# Patient Record
Sex: MALE | HISPANIC OR LATINO
[De-identification: names, ages, dates, MRNs, and addresses within clinical notes are randomized per-mention and may not be internally consistent; named-entity substitution may affect disease eponyms.]

---

## 2024-06-17 PROBLEM — Z00.00 ENCOUNTER FOR PREVENTIVE HEALTH EXAMINATION: Status: ACTIVE | Noted: 2024-06-17

## 2024-06-24 ENCOUNTER — APPOINTMENT (OUTPATIENT)
Dept: PULMONOLOGY | Facility: CLINIC | Age: 54
End: 2024-06-24
Payer: COMMERCIAL

## 2024-06-24 VITALS
BODY MASS INDEX: 30.53 KG/M2 | SYSTOLIC BLOOD PRESSURE: 148 MMHG | OXYGEN SATURATION: 97 % | DIASTOLIC BLOOD PRESSURE: 96 MMHG | HEART RATE: 95 BPM | HEIGHT: 66 IN | TEMPERATURE: 97.8 F | WEIGHT: 190 LBS

## 2024-06-24 DIAGNOSIS — Z87.891 PERSONAL HISTORY OF NICOTINE DEPENDENCE: ICD-10-CM

## 2024-06-24 DIAGNOSIS — R06.83 SNORING: ICD-10-CM

## 2024-06-24 DIAGNOSIS — R05.9 COUGH, UNSPECIFIED: ICD-10-CM

## 2024-06-24 DIAGNOSIS — Z82.49 FAMILY HISTORY OF ISCHEMIC HEART DISEASE AND OTHER DISEASES OF THE CIRCULATORY SYSTEM: ICD-10-CM

## 2024-06-24 DIAGNOSIS — I10 ESSENTIAL (PRIMARY) HYPERTENSION: ICD-10-CM

## 2024-06-24 DIAGNOSIS — K21.9 GASTRO-ESOPHAGEAL REFLUX DISEASE W/OUT ESOPHAGITIS: ICD-10-CM

## 2024-06-24 DIAGNOSIS — Z92.89 PERSONAL HISTORY OF OTHER MEDICAL TREATMENT: ICD-10-CM

## 2024-06-24 DIAGNOSIS — Z86.2 PERSONAL HISTORY OF DISEASES OF THE BLOOD AND BLOOD-FORMING ORGANS AND CERTAIN DISORDERS INVOLVING THE IMMUNE MECHANISM: ICD-10-CM

## 2024-06-24 LAB — HEMOGLOBIN: 13.9

## 2024-06-24 PROCEDURE — 94729 DIFFUSING CAPACITY: CPT

## 2024-06-24 PROCEDURE — ZZZZZ: CPT

## 2024-06-24 PROCEDURE — 94010 BREATHING CAPACITY TEST: CPT

## 2024-06-24 PROCEDURE — 71046 X-RAY EXAM CHEST 2 VIEWS: CPT

## 2024-06-24 PROCEDURE — 99204 OFFICE O/P NEW MOD 45 MIN: CPT | Mod: 25

## 2024-06-24 PROCEDURE — 85018 HEMOGLOBIN: CPT | Mod: QW

## 2024-06-24 PROCEDURE — 95012 NITRIC OXIDE EXP GAS DETER: CPT

## 2024-06-24 PROCEDURE — 94727 GAS DIL/WSHOT DETER LNG VOL: CPT

## 2024-07-03 ENCOUNTER — APPOINTMENT (OUTPATIENT)
Dept: PULMONOLOGY | Facility: CLINIC | Age: 54
End: 2024-07-03

## 2024-07-08 ENCOUNTER — APPOINTMENT (OUTPATIENT)
Dept: PULMONOLOGY | Facility: CLINIC | Age: 54
End: 2024-07-08

## 2024-08-09 ENCOUNTER — APPOINTMENT (OUTPATIENT)
Dept: ORTHOPEDIC SURGERY | Facility: CLINIC | Age: 54
End: 2024-08-09

## 2024-08-09 PROBLEM — Z86.79 HISTORY OF HYPERTENSION: Status: RESOLVED | Noted: 2024-06-24 | Resolved: 2024-08-09

## 2024-08-09 PROBLEM — M23.90 INTERNAL DERANGEMENT OF KNEE, UNSPECIFIED LATERALITY: Status: ACTIVE | Noted: 2024-08-09

## 2024-08-09 PROBLEM — Z86.2 HISTORY OF ANEMIA: Status: RESOLVED | Noted: 2024-06-24 | Resolved: 2024-08-09

## 2024-08-09 PROBLEM — M17.12 ARTHRITIS OF KNEE, LEFT: Status: ACTIVE | Noted: 2024-08-09

## 2024-08-09 PROBLEM — M17.11 ARTHRITIS OF KNEE, RIGHT: Status: ACTIVE | Noted: 2024-08-09

## 2024-08-09 PROCEDURE — 99203 OFFICE O/P NEW LOW 30 MIN: CPT | Mod: 25

## 2024-08-09 PROCEDURE — 73564 X-RAY EXAM KNEE 4 OR MORE: CPT | Mod: 50

## 2024-08-09 PROCEDURE — 20610 DRAIN/INJ JOINT/BURSA W/O US: CPT | Mod: 50

## 2024-08-09 NOTE — IMAGING
[Bilateral] : knee bilaterally [Loose body] : Loose body [Moderate tricompartmental OA medial narrowing] : Moderate tricompartmental OA medial narrowing [de-identified] : Constitutional: The patient appears well developed, well nourished. Skin: No impressive skin lesions present, except as noted in detailed exam. Lymphatic: No palpable lymphadenopathy in examined body areas. Neurologic: Alert and oriented to time, place and person.   BILATERAL KNEE:  Inspection: mild effusion Palpation: medial joint line tenderness, anterior tenderness Knee Range of Motion: 3-125 Strength: 5/5 Quadriceps strength, 5/5 Hamstring strength Neurological: light touch is intact throughout Ligament Stability and Special Tests: McMurrays: neg Lachman: neg Pivot Shift: neg Posterior Drawer: neg Valgus: neg Varus: neg Patella Apprehension: neg Patella Maltracking: neg

## 2024-08-09 NOTE — HISTORY OF PRESENT ILLNESS
[de-identified] : 8/9/24: B/L Knee pain increased following stress test about a 1.5 months ago. NKI to the knees. R>L at this time. Patient has discomfort and sharp pain with twisting, standing for longer periods, stairs as well. Wearing KT tape   occ: Construction

## 2024-08-09 NOTE — PROCEDURE
[FreeTextEntry3] : Patient Identification  Name/: Verbal with patient and/or family    Procedure Verification:  Procedure confirmed with patient or family/designee  Consent for procedure: Verbal Consent Given  Relevant documentation completed, reviewed, and signed  Clinical indications for procedure confirmed    Time-out with all members of procedure team immediately prior to procedure:  Correct patient identified. Agreement on procedure. Correct side and site.    KNEE INJECTION (STEROID) - B/L  After verbal consent and identification of the correct patient and correct site, the B/L superolateral knees were prepped using alcohol swabs and betadine. This was allowed time to air dry. A mixture of 1cc Celestone 6mg/ml, 2cc Lidocaine 1%, and 2cc Bupivacaine 0.5% was injected into the suprapatellar pouch using a sterile 22G needle with US after ethyl chloride spray for skin anesthesia. The patient tolerated the procedure well. After-care instructions were provided and included instructions to ice the area and to call if redness, pain, or fever develop.

## 2024-08-09 NOTE — DISCUSSION/SUMMARY
[de-identified] : The natural progression of osteoarthritis was explained to the patient.  We discussed the possible treatment options from conservative to operative.  These included NSAIDS, Glucosamine and Chondrotin sulfate, and Physical Therapy as well different types of injections.  We also discussed that at some point they may progress to needed a TKA.  Information and pamphlets were given.  We discussed their diagnosis and treatment options at length including surgical and non-surgical options. We will first attempt conservative treatment with activity modification, PT, icing, weight loss, and anti-inflammatory medications. We discussed the possible of injections (steroid and viscosupplementation) in the future. The patient was provided with a PT prescription to work on ROM, hip ER/abductors strengthening, quad/hamstring stretches and strengthening, and other exercises on the Knee Arthritis Protocol.  Dx / Natural History: The patient was advised of the diagnosis.  The natural history of the pathology was explained in full to the patient in layman's terms.  Several different treatment options were discussed and explained in full to the patient including the risks and benefits of both surgical and non-surgical treatments.  All questions and concerns were answered.   Pain Guide Activities: The patient was advised to let pain guide the gradual advancement of activities.  Icing: The patient was advised to apply ice (wrapped in a towel or protective covering) to the area daily (20 minutes at a time, 2-4X/day).  Discussed csi vs HA inj. Patient would like csi in B/L knees today.  Will request authorization for Euflexxa  Follow up after auth

## 2024-09-06 ENCOUNTER — APPOINTMENT (OUTPATIENT)
Dept: ORTHOPEDIC SURGERY | Facility: CLINIC | Age: 54
End: 2024-09-06

## 2024-09-06 VITALS — HEIGHT: 66 IN | WEIGHT: 179 LBS | BODY MASS INDEX: 28.77 KG/M2

## 2024-09-06 PROCEDURE — 99212 OFFICE O/P EST SF 10 MIN: CPT | Mod: 25

## 2024-09-06 PROCEDURE — 20611 DRAIN/INJ JOINT/BURSA W/US: CPT | Mod: 50

## 2024-09-06 PROCEDURE — 20610 DRAIN/INJ JOINT/BURSA W/O US: CPT | Mod: 50

## 2024-09-06 NOTE — DISCUSSION/SUMMARY
[de-identified] : The natural progression of osteoarthritis was explained to the patient.  We discussed the possible treatment options from conservative to operative.  These included NSAIDS, Glucosamine and Chondrotin sulfate, and Physical Therapy as well different types of injections.  We also discussed that at some point they may progress to needed a TKA.  Information and pamphlets were given.  We discussed their diagnosis and treatment options at length including surgical and non-surgical options. We will first attempt conservative treatment with activity modification, PT, icing, weight loss, and anti-inflammatory medications. We discussed the possible of injections (steroid and viscosupplementation) in the future. The patient was provided with a PT prescription to work on ROM, hip ER/abductors strengthening, quad/hamstring stretches and strengthening, and other exercises on the Knee Arthritis Protocol.  Dx / Natural History: The patient was advised of the diagnosis.  The natural history of the pathology was explained in full to the patient in layman's terms.  Several different treatment options were discussed and explained in full to the patient including the risks and benefits of both surgical and non-surgical treatments.  All questions and concerns were answered.   Pain Guide Activities: The patient was advised to let pain guide the gradual advancement of activities.  Icing: The patient was advised to apply ice (wrapped in a towel or protective covering) to the area daily (20 minutes at a time, 2-4X/day).  Euflexxa inj #1 in B/L knees today - yecenia well  Follow up 1 week to continue series

## 2024-09-06 NOTE — IMAGING
[de-identified] : Constitutional: The patient appears well developed, well nourished. Skin: No impressive skin lesions present, except as noted in detailed exam. Lymphatic: No palpable lymphadenopathy in examined body areas. Neurologic: Alert and oriented to time, place and person.   BILATERAL KNEE:  Inspection: mild effusion Palpation: medial joint line tenderness, anterior tenderness Knee Range of Motion: 3-125 Strength: 5/5 Quadriceps strength, 5/5 Hamstring strength Neurological: light touch is intact throughout Ligament Stability and Special Tests: McMurrays: neg Lachman: neg Pivot Shift: neg Posterior Drawer: neg Valgus: neg Varus: neg Patella Apprehension: neg Patella Maltracking: neg

## 2024-09-06 NOTE — HISTORY OF PRESENT ILLNESS
[de-identified] : 9/6/24: Here for follow up of B/L knees. Received CSI at last appointment, states provided 1 week relief. Pt c/o persistent pain in b/l knees and would like to proceed w/ Euflexxa injections #1 in both knees.   8/9/24: B/L Knee pain increased following stress test about a 1.5 months ago. NKI to the knees. R>L at this time. Patient has discomfort and sharp pain with twisting, standing for longer periods, stairs as well. Wearing KT tape   occ: Construction

## 2024-09-06 NOTE — HISTORY OF PRESENT ILLNESS
[de-identified] : 9/6/24: Here for follow up of B/L knees. Received CSI at last appointment, states provided 1 week relief. Pt c/o persistent pain in b/l knees and would like to proceed w/ Euflexxa injections #1 in both knees.   8/9/24: B/L Knee pain increased following stress test about a 1.5 months ago. NKI to the knees. R>L at this time. Patient has discomfort and sharp pain with twisting, standing for longer periods, stairs as well. Wearing KT tape   occ: Construction

## 2024-09-06 NOTE — IMAGING
[de-identified] : Constitutional: The patient appears well developed, well nourished. Skin: No impressive skin lesions present, except as noted in detailed exam. Lymphatic: No palpable lymphadenopathy in examined body areas. Neurologic: Alert and oriented to time, place and person.   BILATERAL KNEE:  Inspection: mild effusion Palpation: medial joint line tenderness, anterior tenderness Knee Range of Motion: 3-125 Strength: 5/5 Quadriceps strength, 5/5 Hamstring strength Neurological: light touch is intact throughout Ligament Stability and Special Tests: McMurrays: neg Lachman: neg Pivot Shift: neg Posterior Drawer: neg Valgus: neg Varus: neg Patella Apprehension: neg Patella Maltracking: neg

## 2024-09-06 NOTE — PROCEDURE
[FreeTextEntry3] : The risks, benefits, and alternatives to viscosupplementation injection were reviewed with the patient. Risks outlined include but are not limited to infection, sepsis, bleeding, scarring, temporary increase in pain, syncopal episode, failure to resolve symptoms, symptoms recurrence, allergic reaction, flare reaction, pseudoseptic reaction.  Patient understood the risks and asked to proceed with this treatment course.  Large joint injection was performed of the BILATERAL knees. The indication for this procedure was pain, inflammation and x-ray evidence of Osteoarthritis on this or prior visit. The site was prepped with alcohol, betadine, ethyl chloride sprayed topically and sterile technique used. An injection of Euflexxa, series # 1 was used.  Patient tolerated procedure well. Patient was advised to call if redness, pain or fever occur and apply ice for 15 minutes out of every hour for the next 12-24 hours as tolerated.  Patient has tried OTC's including aspirin, Ibuprofen, Aleve, etc or prescription NSAIDS, and/or exercises at home and/or physical therapy without satisfactory response, patient had decreased mobility in the joint and the risks benefits, and alternatives have been discussed, and verbal consent was obtained.

## 2024-09-06 NOTE — DISCUSSION/SUMMARY
[de-identified] : The natural progression of osteoarthritis was explained to the patient.  We discussed the possible treatment options from conservative to operative.  These included NSAIDS, Glucosamine and Chondrotin sulfate, and Physical Therapy as well different types of injections.  We also discussed that at some point they may progress to needed a TKA.  Information and pamphlets were given.  We discussed their diagnosis and treatment options at length including surgical and non-surgical options. We will first attempt conservative treatment with activity modification, PT, icing, weight loss, and anti-inflammatory medications. We discussed the possible of injections (steroid and viscosupplementation) in the future. The patient was provided with a PT prescription to work on ROM, hip ER/abductors strengthening, quad/hamstring stretches and strengthening, and other exercises on the Knee Arthritis Protocol.  Dx / Natural History: The patient was advised of the diagnosis.  The natural history of the pathology was explained in full to the patient in layman's terms.  Several different treatment options were discussed and explained in full to the patient including the risks and benefits of both surgical and non-surgical treatments.  All questions and concerns were answered.   Pain Guide Activities: The patient was advised to let pain guide the gradual advancement of activities.  Icing: The patient was advised to apply ice (wrapped in a towel or protective covering) to the area daily (20 minutes at a time, 2-4X/day).  Euflexxa inj #1 in B/L knees today - yecenia well  Follow up 1 week to continue series

## 2024-09-13 ENCOUNTER — APPOINTMENT (OUTPATIENT)
Dept: ORTHOPEDIC SURGERY | Facility: CLINIC | Age: 54
End: 2024-09-13

## 2024-09-13 VITALS — WEIGHT: 179 LBS | BODY MASS INDEX: 28.77 KG/M2 | HEIGHT: 66 IN

## 2024-09-13 PROCEDURE — 20610 DRAIN/INJ JOINT/BURSA W/O US: CPT | Mod: 50

## 2024-09-13 PROCEDURE — 99213 OFFICE O/P EST LOW 20 MIN: CPT | Mod: 25

## 2024-09-13 NOTE — PROCEDURE
[FreeTextEntry3] : The risks, benefits, and alternatives to viscosupplementation injection were reviewed with the patient. Risks outlined include but are not limited to infection, sepsis, bleeding, scarring, temporary increase in pain, syncopal episode, failure to resolve symptoms, symptoms recurrence, allergic reaction, flare reaction, pseudoseptic reaction.  Patient understood the risks and asked to proceed with this treatment course.  Large joint injection was performed of the BILATERAL knees. The indication for this procedure was pain, inflammation and x-ray evidence of Osteoarthritis on this or prior visit. The site was prepped with alcohol, betadine, ethyl chloride sprayed topically and sterile technique used. An injection of Euflexxa, series # 2 was used.  Patient tolerated procedure well. Patient was advised to call if redness, pain or fever occur and apply ice for 15 minutes out of every hour for the next 12-24 hours as tolerated.  Patient has tried OTC's including aspirin, Ibuprofen, Aleve, etc or prescription NSAIDS, and/or exercises at home and/or physical therapy without satisfactory response, patient had decreased mobility in the joint and the risks benefits, and alternatives have been discussed, and verbal consent was obtained.

## 2024-09-13 NOTE — IMAGING
[de-identified] : Constitutional: The patient appears well developed, well nourished. Skin: No impressive skin lesions present, except as noted in detailed exam. Lymphatic: No palpable lymphadenopathy in examined body areas. Neurologic: Alert and oriented to time, place and person.   BILATERAL KNEES:  Inspection: mild effusion Palpation: medial joint line tenderness, anterior tenderness Knee Range of Motion: 3-125 Strength: 5/5 Quadriceps strength, 5/5 Hamstring strength Neurological: light touch is intact throughout Ligament Stability and Special Tests: McMurrays: neg Lachman: neg Pivot Shift: neg Posterior Drawer: neg Valgus: neg Varus: neg Patella Apprehension: neg Patella Maltracking: neg

## 2024-09-13 NOTE — DISCUSSION/SUMMARY
[de-identified] : Assessment:   The patient is a 54 year old male with physical exam findings consistent with BILATERAL KNEE OSTEOARTHRITIS     - We discussed their diagnosis and treatment options at length including the risks and benefits of both surgical and nonsurgical options. - We will continue conservative treatment with activity modification, icing, weight loss, and anti-inflammatory medications. - The patient was advised to let pain guide the gradual advancement of activities. - Pt tolerated procedure well, f/u in 1 week for Euflexxa #3 b/l knees   Dx / Natural History: The natural progression of osteoarthritis was explained to the patient.  We discussed the possible treatment options from conservative to operative.  These included NSAIDS, Glucosamine and Chondrotin sulfate, and Physical Therapy as well different types of injections.  We also discussed that at some point they may progress to needed a TKA.   The patient was advised of the diagnosis.  The natural history of the pathology was explained in full to the patient in layman's terms.  Several different treatment options were discussed and explained in full to the patient including the risks and benefits of both surgical and non-surgical treatments.  All questions and concerns were answered.   Pain Guide Activities: The patient was advised to let pain guide the gradual advancement of activities.  Icing: The patient was advised to apply ice (wrapped in a towel or protective covering) to the area daily (20 minutes at a time, 2-4X/day).

## 2024-09-13 NOTE — HISTORY OF PRESENT ILLNESS
[de-identified] : 9/13/24: Here for follow up of B/L knees. Euflexxa injections #2 in both knees.   9/6/24: Here for follow up of B/L knees. Received CSI at last appointment, states provided 1 week relief. Pt c/o persistent pain in b/l knees and would like to proceed w/ Euflexxa injections #1 in both knees.   8/9/24: B/L Knee pain increased following stress test about a 1.5 months ago. NKI to the knees. R>L at this time. Patient has discomfort and sharp pain with twisting, standing for longer periods, stairs as well. Wearing KT tape   occ: Construction

## 2024-09-20 ENCOUNTER — APPOINTMENT (OUTPATIENT)
Dept: ORTHOPEDIC SURGERY | Facility: CLINIC | Age: 54
End: 2024-09-20
Payer: COMMERCIAL

## 2024-09-20 VITALS — BODY MASS INDEX: 28.77 KG/M2 | WEIGHT: 179 LBS | HEIGHT: 66 IN

## 2024-09-20 DIAGNOSIS — M17.11 UNILATERAL PRIMARY OSTEOARTHRITIS, RIGHT KNEE: ICD-10-CM

## 2024-09-20 DIAGNOSIS — M17.12 UNILATERAL PRIMARY OSTEOARTHRITIS, LEFT KNEE: ICD-10-CM

## 2024-09-20 PROCEDURE — 20611 DRAIN/INJ JOINT/BURSA W/US: CPT | Mod: 50

## 2024-09-20 PROCEDURE — 99202 OFFICE O/P NEW SF 15 MIN: CPT | Mod: 25

## 2024-09-20 NOTE — PROCEDURE
[Large Joint Injection] : Large joint injection [Bilateral] : bilaterally of the [Knee] : knee [Pain] : pain [Inflammation] : inflammation [X-ray evidence of Osteoarthritis on this or prior visit] : x-ray evidence of Osteoarthritis on this or prior visit [Alcohol] : alcohol [Betadine] : betadine [Ethyl Chloride sprayed topically] : ethyl chloride sprayed topically [Sterile technique used] : sterile technique used [Euflexxa(20mg)] : 20mg of Euflexxa [#3] : series #3 [] : Patient tolerated procedure well [Call if redness, pain or fever occur] : call if redness, pain or fever occur [Apply ice for 15min out of every hour for the next 12-24 hours as tolerated] : apply ice for 15 minutes out of every hour for the next 12-24 hours as tolerated [Previous OTC use and PT nontherapeutic] : patient has tried OTC's including aspirin, Ibuprofen, Aleve, etc or prescription NSAIDS, and/or exercises at home and/or physical therapy without satisfactory response [Patient had decreased mobility in the joint] : patient had decreased mobility in the joint [Risks, benefits, alternatives discussed / Verbal consent obtained] : the risks benefits, and alternatives have been discussed, and verbal consent was obtained [Prior failure or difficult injection] : prior failure or difficult injection [All ultrasound images have been permanently captured and stored accordingly in our picture archiving and communication system] : All ultrasound images have been permanently captured and stored accordingly in our picture archiving and communication system [Visualization of the needle and placement of injection was performed without complication] : visualization of the needle and placement of injection was performed without complication

## 2024-09-20 NOTE — DISCUSSION/SUMMARY
[de-identified] : General Dx Discussion The patient was advised of the diagnosis. The natural history of the pathology was explained in full to the patient in layman's terms. All questions were answered. The risks and benefits of surgical and non-surgical treatment alternatives were explained in full to the patient.  f/u Dr Monroy 6 weeks

## 2024-09-20 NOTE — PHYSICAL EXAM
[Bilateral] : knee bilaterally [NL (0)] : extension 0 degrees [5___] : hamstring 5[unfilled]/5 [] : no pain with varus stress [[id number]] : flexion 115 degrees

## 2024-09-20 NOTE — HISTORY OF PRESENT ILLNESS
[3] : 3 [Euflexxa] : Euflexxa [de-identified] : Pt here for eval bilat knees  [] : Post Surgical Visit: no [de-identified] : Bl Knees

## 2024-11-01 ENCOUNTER — APPOINTMENT (OUTPATIENT)
Dept: ORTHOPEDIC SURGERY | Facility: CLINIC | Age: 54
End: 2024-11-01

## 2024-11-01 VITALS — WEIGHT: 179 LBS | HEIGHT: 66 IN | BODY MASS INDEX: 28.77 KG/M2

## 2024-11-01 DIAGNOSIS — M17.12 UNILATERAL PRIMARY OSTEOARTHRITIS, LEFT KNEE: ICD-10-CM

## 2024-11-01 DIAGNOSIS — M17.11 UNILATERAL PRIMARY OSTEOARTHRITIS, RIGHT KNEE: ICD-10-CM

## 2024-11-01 PROCEDURE — 99214 OFFICE O/P EST MOD 30 MIN: CPT
